# Patient Record
Sex: MALE | Race: WHITE | NOT HISPANIC OR LATINO | Employment: FULL TIME | URBAN - METROPOLITAN AREA
[De-identification: names, ages, dates, MRNs, and addresses within clinical notes are randomized per-mention and may not be internally consistent; named-entity substitution may affect disease eponyms.]

---

## 2020-02-09 ENCOUNTER — OFFICE VISIT (OUTPATIENT)
Dept: URGENT CARE | Facility: URGENT CARE | Age: 55
End: 2020-02-09
Payer: COMMERCIAL

## 2020-02-09 VITALS
SYSTOLIC BLOOD PRESSURE: 157 MMHG | OXYGEN SATURATION: 98 % | HEART RATE: 88 BPM | TEMPERATURE: 97.7 F | DIASTOLIC BLOOD PRESSURE: 100 MMHG | WEIGHT: 230 LBS | BODY MASS INDEX: 30.48 KG/M2 | HEIGHT: 73 IN

## 2020-02-09 DIAGNOSIS — T23.142A SUPERFICIAL BURN OF MULTIPLE DIGITS OF LEFT HAND INCLUDING SUPERFICIAL BURN OF THUMB, INITIAL ENCOUNTER: Primary | ICD-10-CM

## 2020-02-09 DIAGNOSIS — T30.0 BURN, FIRST DEGREE: ICD-10-CM

## 2020-02-09 PROCEDURE — 99203 OFFICE O/P NEW LOW 30 MIN: CPT | Performed by: NURSE PRACTITIONER

## 2020-02-09 RX ORDER — SILVER SULFADIAZINE 10 MG/G
CREAM TOPICAL 2 TIMES DAILY
Qty: 50 G | Refills: 0 | Status: SHIPPED | OUTPATIENT
Start: 2020-02-09 | End: 2020-06-25

## 2020-02-09 RX ORDER — ASPIRIN 81 MG/1
81 TABLET ORAL DAILY
COMMUNITY
End: 2023-02-08

## 2020-02-09 ASSESSMENT — ENCOUNTER SYMPTOMS
WEAKNESS: 0
CHILLS: 0
FEVER: 0
MYALGIAS: 0
NUMBNESS: 0
WOUND: 1

## 2020-02-09 ASSESSMENT — MIFFLIN-ST. JEOR: SCORE: 1932.15

## 2020-02-10 NOTE — PATIENT INSTRUCTIONS
Patient Education     First-Degree Burn  A burn occurs when skin is exposed to too much heat, sun, or harsh chemicals. A first-degree burn (superficial burn) causes mainly redness. It heals in a few days.  Home care  Follow these guidelines when caring for yourself at home:    Use pain medicine as directed. You may use over-the-counter medicine to control pain if no pain medicine was prescribed. If you have chronic liver or kidney disease, talk with your healthcare provider before using acetaminophen or ibuprofen. Also talk with your provider if you've had a stomach ulcer or GI bleeding.    On the first day, you may put a cool compress on the burn to relieve pain. You can use a small towel soaked in cool water as a cool compress.    Numbing medicines for sunburn can be used for temporary relief of the burning feeling. These medicines include lidocaine or benzocaine. You don t need a prescription for them.    Moisturizers with aloe vera can help soothe the burn.    Don't pick or scratch at the affected areas. Use over-the-counter medicines like diphenhydramine for itching. This medicine is taken by mouth.    Since you don't have an open wound, you don't need to use antibiotic cream or ointment. Sometimes an infection may occur even with proper treatment. Be sure to check the burn daily for the signs of infection listed below.    Wear a hat, sunscreen, and long sleeves while in the sun.    Wear loose-fitting clothing until the burn heals.  Follow-up care  Follow up with your healthcare provider, or as advised. Most first-degree burns heal well without complications.  When to seek medical advice  Call your healthcare provider right away if any of these signs of infection occur:    Fever of 100.4 F (38 C) or higher, or as directed by your healthcare provider    Pain gets worse    Redness or swelling gets worse    Pus comes from the burn    Red streaks in your skin come from the burn    Wound doesn't appear to be  healing  Date Last Reviewed: 12/1/2016 2000-2019 [employer]. [address] [place] [address], [place] [address]. All rights reserved. This information is not intended as a substitute for professional medical care. Always follow your healthcare professional's instructions.

## 2020-02-10 NOTE — PROGRESS NOTES
"SUBJECTIVE:   Dontrell Billings is a 54 year old male presenting with a chief complaint of   Chief Complaint   Patient presents with     Urgent Care     Pt in clinic to have eval for left hand index finger burn.     Burn       He is a new patient of [facility].    MS Injury/Burn    Onset of symptoms/accident occurred about 1 hour ago at home while throwing out oil  Location: LEFT hand/thumb  Context:       Patient experienced immediate pain, immediate swelling, set hand in running water  Course of symptoms is same.    Severity 8/10  Current and Associated symptoms: Pain and Redness  Denies any numbness or tingling   Aggravating Factors: movement  Therapies to improve symptoms include: ice  This is not the first time this type of problem has occurred for this patient.       Review of Systems   Constitutional: Negative for chills and fever.   Musculoskeletal: Negative for myalgias.   Skin: Positive for wound.   Neurological: Negative for weakness and numbness.   All other systems reviewed and are negative.      History reviewed. No pertinent past medical history.  History reviewed. No pertinent family history.  Current Outpatient Medications   Medication Sig Dispense Refill     aspirin 81 MG EC tablet Take 81 mg by mouth daily       silver sulfADIAZINE (SILVADENE) 1 % external cream Apply topically 2 times daily for 7 days 50 g 0     Social History     Tobacco Use     Smoking status: Never Smoker     Smokeless tobacco: Never Used   Substance Use Topics     Alcohol use: Not on file       OBJECTIVE  BP (!) 157/100   Pulse 88   Temp 97.7  F (36.5  C) (Oral)   Ht 1.854 m (6' 1\")   Wt 104.3 kg (230 lb)   SpO2 98%   BMI 30.34 kg/m      Physical Exam  Constitutional:       Appearance: Normal appearance.   Cardiovascular:      Rate and Rhythm: Normal rate and regular rhythm.      Pulses: Normal pulses.      Heart sounds: Normal heart sounds.   Pulmonary:      Effort: Pulmonary effort is normal.      Breath sounds: Normal " breath sounds. No wheezing or rhonchi.   Musculoskeletal: Normal range of motion.         General: Tenderness and signs of injury present. No swelling.      Right lower leg: No edema.      Left lower leg: No edema.      Comments: LEFT THUMB and INDEX finger with erythema, and blanching. Index finger noted with small superficial area of peeling skin. Strength 5/5   Skin:     General: Skin is warm.      Capillary Refill: Capillary refill takes less than 2 seconds.      Coloration: Skin is pale.      Findings: Erythema present.   Neurological:      General: No focal deficit present.      Mental Status: He is alert and oriented to person, place, and time. Mental status is at baseline.   Psychiatric:         Mood and Affect: Mood normal.         Behavior: Behavior normal.         Labs:  No results found for this or any previous visit (from the past 24 hour(s)).    ASSESSMENT:    ICD-10-CM    1. Superficial burn of multiple digits of left hand including superficial burn of thumb, initial encounter T23.142A silver sulfADIAZINE (SILVADENE) 1 % external cream   2. Burn, first degree T30.0         Medical Decision Making:    Differential Diagnosis:  MS Injury Pain: burn    Serious Comorbid Conditions:  Adult:  None    PLAN: Discussed with patient and parent overall cares involving a superficial burn. Importance of monitoring wound for signs of infection reviewed. Application of silvadene and wound wrapped with telfa dressings. Next steps with Silvadene and wound care discussed. Education provided. Patient agreed to the plan of care with no further questions or concerns.      RAMIRO Churchill, CNP    Patient Instructions     Patient Education     First-Degree Burn  A burn occurs when skin is exposed to too much heat, sun, or harsh chemicals. A first-degree burn (superficial burn) causes mainly redness. It heals in a few days.  Home care  Follow these guidelines when caring for yourself at home:    Use pain medicine as  directed. You may use over-the-counter medicine to control pain if no pain medicine was prescribed. If you have chronic liver or kidney disease, talk with your healthcare provider before using acetaminophen or ibuprofen. Also talk with your provider if you've had a stomach ulcer or GI bleeding.    On the first day, you may put a cool compress on the burn to relieve pain. You can use a small towel soaked in cool water as a cool compress.    Numbing medicines for sunburn can be used for temporary relief of the burning feeling. These medicines include lidocaine or benzocaine. You don t need a prescription for them.    Moisturizers with aloe vera can help soothe the burn.    Don't pick or scratch at the affected areas. Use over-the-counter medicines like diphenhydramine for itching. This medicine is taken by mouth.    Since you don't have an open wound, you don't need to use antibiotic cream or ointment. Sometimes an infection may occur even with proper treatment. Be sure to check the burn daily for the signs of infection listed below.    Wear a hat, sunscreen, and long sleeves while in the sun.    Wear loose-fitting clothing until the burn heals.  Follow-up care  Follow up with your healthcare provider, or as advised. Most first-degree burns heal well without complications.  When to seek medical advice  Call your healthcare provider right away if any of these signs of infection occur:    Fever of 100.4 F (38 C) or higher, or as directed by your healthcare provider    Pain gets worse    Redness or swelling gets worse    Pus comes from the burn    Red streaks in your skin come from the burn    Wound doesn't appear to be healing  Date Last Reviewed: 12/1/2016 2000-2019 [employer]. [address] [place] [id number]. All rights reserved. This information is not intended as a substitute for professional medical care. Always follow your healthcare professional's instructions.

## 2020-05-20 ENCOUNTER — OFFICE VISIT (OUTPATIENT)
Dept: URGENT CARE | Facility: URGENT CARE | Age: 55
End: 2020-05-20
Payer: COMMERCIAL

## 2020-05-20 VITALS
OXYGEN SATURATION: 99 % | DIASTOLIC BLOOD PRESSURE: 96 MMHG | BODY MASS INDEX: 30.34 KG/M2 | WEIGHT: 230 LBS | SYSTOLIC BLOOD PRESSURE: 157 MMHG | HEART RATE: 76 BPM | TEMPERATURE: 98.8 F

## 2020-05-20 DIAGNOSIS — L03.211 FACIAL CELLULITIS: Primary | ICD-10-CM

## 2020-05-20 PROCEDURE — 99213 OFFICE O/P EST LOW 20 MIN: CPT | Performed by: PHYSICIAN ASSISTANT

## 2020-05-20 RX ORDER — SULFAMETHOXAZOLE/TRIMETHOPRIM 800-160 MG
1 TABLET ORAL 2 TIMES DAILY
Qty: 14 TABLET | Refills: 0 | Status: SHIPPED | OUTPATIENT
Start: 2020-05-20 | End: 2020-06-25

## 2020-05-20 NOTE — PROGRESS NOTES
CHIEF COMPLAINT:   Chief Complaint   Patient presents with     Urgent Care     Sore     c/o sore on nose for 1 day       HPI: Dontrell Billings is a 54 year old male who presents to clinic today for evaluation of nose lesion. Patient woke up this AM and had a painful swollen area on his nose. He placed silver cream on his nose, but has noticed that the redness has migrated upwards. He has some tenderness on the lateral aspect of his nose. He has not had fever or chills. No drainage from the area. Does not have history of MRSA.     No past medical history on file.  No past surgical history on file.  Social History     Tobacco Use     Smoking status: Never Smoker     Smokeless tobacco: Never Used   Substance Use Topics     Alcohol use: Not on file     Current Outpatient Medications   Medication     aspirin 81 MG EC tablet     sulfamethoxazole-trimethoprim (BACTRIM DS) 800-160 MG tablet     No current facility-administered medications for this visit.      No Known Allergies    10 point ROS of systems including Constitutional, Eyes, Respiratory, Cardiovascular, Gastroenterology, Genitourinary, Integumentary, Muscularskeletal, Psychiatric were all negative except for pertinent positives noted in my HPI.        Exam:  BP (!) 157/96   Pulse 76   Temp 98.8  F (37.1  C) (Oral)   Wt 104.3 kg (230 lb)   SpO2 99%   BMI 30.34 kg/m    Constitutional: healthy, alert and no distress  Head: Normocephalic, atraumatic.  Eyes: conjunctiva clear, no drainage  ENT: MMM. L Lateral aspect of nose has redness and swelling with induration. Mild amount of redness on lateral aspect of nose. NO sinus tenderness.   Neck: neck is supple, no cervical lymphadenopathy or nuchal rigidity  Cardiovascular: RRR  Respiratory: CTA bilaterally, no rhonchi or rales  Skin: no rashes  Neurologic: Speech clear, gait normal. Moves all extremities.    ASSESSMENT/PLAN:  1. Facial cellulitis  DDX: Herpes zoster, erysipelas, allergic reaction / hives,  candida  Induration, erythema and some TTP on left lateral aspect of nose without systemic symptoms. Rash and my physical exam consistent with cellulitis. Area is not improving with topical management. RX was sent to his pharmacy to begin taking tonight.   If worsening symptoms, follow-up in clinic or ER  - sulfamethoxazole-trimethoprim (BACTRIM DS) 800-160 MG tablet; Take 1 tablet by mouth 2 times daily for 7 days  Dispense: 14 tablet; Refill: 0    Diagnosis and treatment plan was reviewed with patient and/or family.   We went over any labs or imaging. Discussed worsening symptoms or little to no relief despite treatment plan to follow-up in clinic or go to ER.  Patient verbalizes understanding. All questions were addressed and answered.   Viri Villanueva PA-C

## 2020-05-20 NOTE — PATIENT INSTRUCTIONS
Patient Education     Facial Cellulitis  Cellulitis is an infection of the deep layers of skin. A break in the skin, such as a cut or scratch, can let bacteria under the skin. It may also occur from an infected oil gland (pimple) or hair follicle. If the bacteria get to deep layers of the skin, it can be serious. If not treated, cellulitis can get into the bloodstream and lymph nodes. The infection can then spread throughout the body. This causes serious illness.  Cellulitis causes the affected skin to become red, swollen, warm, and sore. The reddened areas have a visible border. You may have a fever, chills, and pain.  Cellulitis is treated with antibiotics taken for 7 to 10 days. Symptoms should get better 1 to 2 days after treatment is started. Make sure to take all the antibiotics for the full number of days until they are gone. Keep taking the medication even if your symptoms go away.  Home care  Follow these tips:    Take all of the antibiotic medicine exactly as directed until it is gone. Don t miss any doses, especially during the first 7 days. Don t stop taking it when your symptoms get better.    Use a cool compress (face cloth soaked in cool water) on your face to help reduce swelling and pain.    You may use acetaminophen or ibuprofen to reduce pain. Don t use these if you have chronic liver or kidney disease, or ever had a stomach ulcer or gastrointestinal bleeding. Talk with your healthcare provider first.  Follow-up care  Follow up with your healthcare provider, or as advised. If your infection does not go away on the first antibiotic, your healthcare provider will prescribe a different one.  When to seek medical advice  Call your healthcare provider right away if any of these occur:    Fever higher of 100.4  F (38.0  C) or higher after 2 days on antibiotics    Red areas that spread    Swelling or pain that gets worse    Fluid leaking from the skin (pus)    An eyelid that swells shut or leaks fluid  (pus)    Headache or neck pain that gets worse    Unusual drowsiness or confusion    Convulsions (seizure)    Change in eyesight     Date Last Reviewed: 9/1/2016 2000-2019 [employer]. [address] [place] [address], [place] [address]. All rights reserved. This information is not intended as a substitute for professional medical care. Always follow your healthcare professional's instructions.

## 2020-06-23 ENCOUNTER — NURSE TRIAGE (OUTPATIENT)
Dept: NURSING | Facility: CLINIC | Age: 55
End: 2020-06-23

## 2020-06-23 NOTE — TELEPHONE ENCOUNTER
He has had a little lump under his skin on his stomach for 2 weeks and not going away. It itched at first, but no itching or pain now. There is a little red area over the lump. It is not getting bigger or worse. Denies fever. [facility]  will call him back now. My transfer on the phone is not working.     Joyce Galindo RN  [facility]  [facility]

## 2020-06-25 ENCOUNTER — OFFICE VISIT (OUTPATIENT)
Dept: FAMILY MEDICINE | Facility: CLINIC | Age: 55
End: 2020-06-25
Payer: COMMERCIAL

## 2020-06-25 VITALS
SYSTOLIC BLOOD PRESSURE: 137 MMHG | HEART RATE: 96 BPM | TEMPERATURE: 98.5 F | RESPIRATION RATE: 18 BRPM | BODY MASS INDEX: 29.95 KG/M2 | OXYGEN SATURATION: 99 % | WEIGHT: 227 LBS | DIASTOLIC BLOOD PRESSURE: 85 MMHG

## 2020-06-25 DIAGNOSIS — R23.8 OTHER SKIN CHANGES: ICD-10-CM

## 2020-06-25 DIAGNOSIS — L72.3 SEBACEOUS CYST: Primary | ICD-10-CM

## 2020-06-25 LAB
KOH PREP SPEC: NORMAL
SPECIMEN SOURCE: NORMAL

## 2020-06-25 PROCEDURE — 99213 OFFICE O/P EST LOW 20 MIN: CPT | Performed by: PHYSICIAN ASSISTANT

## 2020-06-25 PROCEDURE — 87220 TISSUE EXAM FOR FUNGI: CPT | Performed by: PHYSICIAN ASSISTANT

## 2020-06-25 NOTE — PROGRESS NOTES
Subjective     Dontrell Billings is a 54 year old male who presents to clinic today for the following health issues:    HPI     Lump Concern   A small bump right above navel, it is almost gone     Duration: 3 weeks ago today     Description (location/character/radiation):        No abdominal pain     Intensity:  mild    Accompanying signs and symptoms:        Fever/Chills: no        Gas/Bloating: no        Nausea/vomitting: no        Diarrhea: no        Dysuria or Hematuria: no     History (previous similar pain/trauma/previous testing): Crohn's     Therapies tried and outcome: None    Was taking a shower three weeks ago noticed small lump above belly button. Worried it was a buried tick. Has been going down since that time. Never painful. There was a faint redness at beginning. No known tick bites to that area. He thinks he found it because it was itchy one day. No joint pain or flu like symptoms. No fevers, chills, night sweats, or unexplained weight loss.       Toe Problem     Duration: about a week ago     Description (location/character/radiation): brown spot on L & R big toe    Intensity:  No pain    Therapies tried and outcome: antifungal Lotrimin cream       Review of Systems   Constitutional, HEENT, cardiovascular, pulmonary, gi and gu systems are negative, except as otherwise noted.      Objective    /85   Pulse 96   Temp 98.5  F (36.9  C) (Oral)   Resp 18   Wt 103 kg (227 lb)   SpO2 99%   BMI 29.95 kg/m    Body mass index is 29.95 kg/m .  Physical Exam  Vitals signs and nursing note reviewed.   Constitutional:       Appearance: Normal appearance.   HENT:      Head: Normocephalic and atraumatic.   Pulmonary:      Effort: Pulmonary effort is normal.   Abdominal:       Musculoskeletal:        Feet:    Neurological:      Mental Status: He is alert and oriented to person, place, and time.        Diagnostic Test Results:  Labs reviewed in Epic  Results for orders placed or performed in visit on  06/25/20 (from the past 24 hour(s))   KOH prep (skin, hair or nails only)    Specimen: toe   Result Value Ref Range    Specimen Description Toe     KOH Skin Hair Nails Test No fungal elements seen            Assessment & Plan     (L72.3) Sebaceous cyst  (primary encounter diagnosis)  Comment: DDx is sebaceous cyst vs lipoma. Very small non tender lump on exam. Has been shrinking in size per patient. Continue to monitor. If worsening can follow up with dermatology.  Plan: DERMATOLOGY REFERRAL          (R23.8) Other skin changes  Comment: Consistent with calluses to bilateral toes 1 and 2. Advised to wear well fitting shoes. KOH negative.  Plan: KOH prep (skin, hair or nails only)           Return for if not improving or worsening.    Palomo Butler PA-C  [facility]

## 2020-06-26 ENCOUNTER — TELEPHONE (OUTPATIENT)
Dept: FAMILY MEDICINE | Facility: CLINIC | Age: 55
End: 2020-06-26

## 2020-06-26 NOTE — TELEPHONE ENCOUNTER
----- Message from Palomo Butler PA-C sent at 6/25/2020  4:22 PM CDT -----  Please call patient and inform no fungus was seen on his skin scraping.   Recommend he try to wear better fitting shoes.  Thanks,  Palomo Butler

## 2020-06-26 NOTE — TELEPHONE ENCOUNTER
Left message on machine to call back.  Ask to speak to any triage nurse, let them know it's a return call.    Leave a number and time that you can be reached.   Josie Perez RN

## 2021-03-22 ENCOUNTER — OFFICE VISIT (OUTPATIENT)
Dept: FAMILY MEDICINE | Facility: CLINIC | Age: 56
End: 2021-03-22
Payer: COMMERCIAL

## 2021-03-22 VITALS
HEART RATE: 88 BPM | HEIGHT: 73 IN | WEIGHT: 240 LBS | SYSTOLIC BLOOD PRESSURE: 146 MMHG | RESPIRATION RATE: 18 BRPM | BODY MASS INDEX: 31.81 KG/M2 | OXYGEN SATURATION: 99 % | DIASTOLIC BLOOD PRESSURE: 87 MMHG | TEMPERATURE: 97.9 F

## 2021-03-22 DIAGNOSIS — L98.9 SKIN LESION: Primary | ICD-10-CM

## 2021-03-22 PROCEDURE — 99202 OFFICE O/P NEW SF 15 MIN: CPT | Performed by: FAMILY MEDICINE

## 2021-03-22 ASSESSMENT — MIFFLIN-ST. JEOR: SCORE: 1976.47

## 2021-03-22 NOTE — PROGRESS NOTES
"    Assessment & Plan     Skin lesion  Left lower lip. Unclear what lesion was initially. By history is slowly healing. Likely exacerbated by patient initially causing slower healing. Discussed allowing a few more weeks to heal. If lesion does not appear to heal in a few weeks discussed skin check by derm.  - DERMATOLOGY ADULT REFERRAL      16 minutes spent on the date of the encounter doing chart review, history and exam, documentation and further activities as noted above    DO PRIMO Mijares [facility]    Vikas Jon is a 55 year old who presents for the following health issues:    HPI   No PCP. Here to discuss skin lesion on lower lip. Notice a small spot on the left side of his lower lip about 6 weeks ago. Started off as a small tag on his lip, scraped it with tooth and noticed a bump the next day. Notes that other cold sores appeared around the same time but have since resolved. Lesion on lip has never been painful, no discharge or bleeding. [facility] it. States lesion has slowly improved but not completely resolved.   Hx of skin cancer on back, melanoma per patient.     Dad with hx of skin cancer.     Concern -  Onset: x 3 months   Description:   Intensity: mild  Progression of Symptoms:  same  Accompanying Signs & Symptoms: none   Previous history of similar problem:   Therapies tried and outcome: None    Review of Systems   CONSTITUTIONAL: NEGATIVE for fever, chills, change in weight  EYES: NEGATIVE for vision changes or irritation  RESP: NEGATIVE for significant cough or SOB  CV: NEGATIVE for chest pain, palpitations or peripheral edema  NEURO: NEGATIVE for weakness, dizziness or paresthesias      Objective    BP (!) 146/87 (BP Location: Left arm, Patient Position: Sitting, Cuff Size: Adult Large)   Pulse 88   Temp 97.9  F (36.6  C) (Tympanic)   Resp 18   Ht 1.861 m (6' 1.25\")   Wt 108.9 kg (240 lb)   SpO2 99%   BMI 31.45 kg/m  "   Body mass index is 31.45 kg/m .  Physical Exam   GENERAL: healthy, alert and no distress  NECK: no adenopathy, no asymmetry, masses, or scars and thyroid normal to palpation  RESP: breathing comfortably, no acute distress  SKIN: pinpoint slightly raised skin colored lesion over left lower lip without ulceration, erythema, discharge.

## 2021-05-11 ENCOUNTER — OFFICE VISIT (OUTPATIENT)
Dept: DERMATOLOGY | Facility: CLINIC | Age: 56
End: 2021-05-11
Payer: COMMERCIAL

## 2021-05-11 DIAGNOSIS — D18.01 CHERRY ANGIOMA: ICD-10-CM

## 2021-05-11 DIAGNOSIS — L91.8 ACHROCHORDON: ICD-10-CM

## 2021-05-11 DIAGNOSIS — D23.9 DERMAL NEVUS: ICD-10-CM

## 2021-05-11 DIAGNOSIS — Z85.828 HISTORY OF SKIN CANCER: Primary | ICD-10-CM

## 2021-05-11 PROCEDURE — 99203 OFFICE O/P NEW LOW 30 MIN: CPT | Mod: GC | Performed by: DERMATOLOGY

## 2021-05-11 ASSESSMENT — PAIN SCALES - GENERAL: PAINLEVEL: NO PAIN (0)

## 2021-05-11 NOTE — PATIENT INSTRUCTIONS
- use SPF 30 or above.  - return to dermatology clinic in 1 year.    Patient Education     Checking for Skin Cancer  You can find cancer early by checking your skin each month. There are 3 kinds of skin cancer. They are melanoma, basal cell carcinoma, and squamous cell carcinoma. Doing monthly skin checks is the best way to find new marks or skin changes. Follow the instructions below for checking your skin.   The ABCDEs of checking moles for melanoma   Check your moles or growths for signs of melanoma using ABCDE:     Asymmetry: the sides of the mole or growth don t match    Border: the edges are ragged, notched, or blurred    Color: the color within the mole or growth varies    Diameter: the mole or growth is larger than 6 mm (size of a pencil eraser)    Evolving: the size, shape, or color of the mole or growth is changing (evolving is not shown in the images below)    Checking for other types of skin cancer  Basal cell carcinoma or squamous cell carcinoma have symptoms such as:       A spot or mole that looks different from all other marks on your skin    Changes in how an area feels, such as itching, tenderness, or pain    Changes in the skin's surface, such as oozing, bleeding, or scaliness    A sore that does not heal    New swelling or redness beyond the border of a mole    Who s at risk?  Anyone can get skin cancer. But you are at greater risk if you have:     Fair skin, light-colored hair, or light-colored eyes    Many moles or abnormal moles on your skin    A history of sunburns from sunlight or tanning beds    A family history of skin cancer    A history of exposure to radiation or chemicals    A weakened immune system  If you have had skin cancer in the past, you are at risk for recurring skin cancer.   How to check your skin  Do your monthly skin checkups in front of a full-length mirror. Check all parts of your body, including your:     Head (ears, face, neck, and scalp)    Torso (front, back, and  sides)    Arms (tops, undersides, upper, and lower armpits)    Hands (palms, backs, and fingers, including under the nails)    Buttocks and genitals    Legs (front, back, and sides)    Feet (tops, soles, toes, including under the nails, and between toes)  If you have a lot of moles, take digital photos of them each month. Make sure to take photos both up close and from a distance. These can help you see if any moles change over time.   Most skin changes are not cancer. But if you see any changes in your skin, call your doctor right away. Only he or she can diagnose a problem. If you have skin cancer, seeing your doctor can be the first step toward getting the treatment that could save your life.   [employer] last reviewed this educational content on 4/1/2019 2000-2020 [employer]. [address] [place] [address]. All rights reserved. This information is not intended as a substitute for professional medical care. Always follow your healthcare professional's instructions.       When should I call my doctor?    If you are worsening or not improving, please, contact us or seek urgent care as noted below.     Who should I call with questions (adults)?    [facility] (adult and pediatric): [phone]     [facility] (adult): [phone]    For urgent needs outside of business hours call the [facility] at [phone] and ask for the dermatology resident on call    If this is a medical emergency and you are unable to reach an ER, Call [phone]      Who should I call with questions (pediatric)?  [facility]- Pediatric Dermatology  Dr. Abby Torres, Dr. Swathi Melara, Dr. Mansi Johnson, Aiyana Beverly, PA  Dr. Lesley Scott, Dr. Halie Craven & Dr. Thanh Maguire  Non Urgent  Nurse Triage Line; [phone]- Pina and Jeane BROCK Care Coordinatorismael Patel (/Complex )  [phone]    If you need a prescription refill, please contact your pharmacy. Refills are approved or denied by our Physicians during normal business hours, Monday through Fridays  Per office policy, refills will not be granted if you have not been seen within the past year (or sooner depending on your child's condition)    Scheduling Information:  Pediatric Appointment Scheduling and Call Center [phone]  Radiology Scheduling- [phone]  Sedation Unit Scheduling- [phone]  [place] Scheduling- General [phone]; Pediatric Dermatology [phone]  Main  Services: [phone]  Singaporean: [phone]  Cameroonian: [phone]  Hmong/Ethiopian/Josue: [phone]  Preadmission Nursing Department Fax Number: [phone] (Fax all pre-operative paperwork to this number)    For urgent matters arising during evenings, weekends, or holidays that cannot wait for normal business hours please call [phone] and ask for the Dermatology Resident On-Call to be paged.

## 2021-05-11 NOTE — NURSING NOTE
Chief Complaint   Patient presents with     Derm Problem     Dontrell is here for a full body skin exam. He states he has not areas of concern.    Martinez Salinas, CMA

## 2021-05-11 NOTE — LETTER
5/11/2021       RE: Dontrell Billings  [address]  [place] [address]     Dear Colleague,    Thank you for referring your patient, Dontrell Billings, to the [facility] [place] at [facility]. Please see a copy of my visit note below.    [facility] Dermatology Note  Encounter Date: May 11, 2021  Office Visit     Dermatology Problem List:  1. Hx unknown skin cancer R lateral back ?2011- patient will request records  ____________________________________________    Assessment & Plan:     # Hx of unknown skin cancer  Was treated in [place] 10 years ago. Patient is uncertain what was removed. Scar does not appear large enough to be a true melanoma. However cannot rule it out. Patient denies a lymph node being removed. NERD today.   - patient asked to obtain records if possible (practice no longer exists.)   - sun protection advised.     # R hand hypopigmented macule  7 x 4cm. Present his entire life. Not changing. On exam, has the appearance of a cafe au lait macule.   - patient will continue to monitor  - reassurance    # Acrochordons x2 inner R/L thigh  Patient is not bothered by these and does want removal.   - reassurance    # Dermal nevi, trunk  Benign appearance on exam.  - reassurance.     Procedures Performed:   none    Follow-up: 1 year(s) in-person, or earlier for new or changing lesions    Staff and Resident:     Dr David Gupta MD PGY-2  Medicine-Dermatology    I have seen and examined this patient and agree with the assessment and plan as documented in the resident's note.    Ad Hernandez MD  Dermatology Attending    ____________________________________________    CC: Derm Problem (Dontrell is here for a full body skin exam. He states he has not areas of concern. )    HPI:  Mr. Dontrell Billings is a(n) 55 year old male who presents today as a new patient for skin check.   In the past he has been to [place]  [facility] skin specialists \"years ago\".   - R lateral back, had excision for an unknown skin cancer in ?2011.  - Benign biopsy per patient of a \"red spot\" on his R scalp in ?2016.  - Family history of ?skin cancer ?father.     He made the appointment because he had a spot with hanging skin that was not healing. Started end of Jan. He was told to see a derm if it did not resolve so he made the appointment. Today the spot is almost completely resolved. Never bled. Not painful today.     Patient is otherwise feeling well, without additional skin concerns.    Labs Reviewed:  N/A    Physical Exam:  Vitals: There were no vitals taken for this visit.  SKIN: Full skin, which includes the head/face, both arms, chest, back, abdomen,both legs, genitalia and/or groin buttocks, digits and/or nails, was examined.  - There is a dome shaped bright red papule on the frontal scalp.   There is a skin colored fleshy papule(s) located on the back.  Multiple regular brown pigmented macules and papules are identified on the trunk, lower extremities.   Well healed hypopigmented linear scar. There is no erythema, telangectasias, nodularity, or pigmentation on the R lateral back scar from prior excision.  There are tan to brown waxy stuck on papules on the trunk.   - No other lesions of concern on areas examined.     Medications:  Current Outpatient Medications   Medication     aspirin 81 MG EC tablet     OMEPRAZOLE PO     No current facility-administered medications for this visit.       Past Medical History:   There is no problem list on file for this patient.    No past medical history on file.    CC Referred Self, MD  No address on file on close of this encounter.      "

## 2021-05-11 NOTE — PROGRESS NOTES
"[facility] Dermatology Note  Encounter Date: May 11, 2021  Office Visit     Dermatology Problem List:  1. Hx unknown skin cancer R lateral back ?2011- patient will request records  ____________________________________________    Assessment & Plan:     # Hx of unknown skin cancer  Was treated in [place] 10 years ago. Patient is uncertain what was removed. Scar does not appear large enough to be a true melanoma. However cannot rule it out. Patient denies a lymph node being removed. NERD today.   - patient asked to obtain records if possible (practice no longer exists.)   - sun protection advised.     # R hand hypopigmented macule  7 x 4cm. Present his entire life. Not changing. On exam, has the appearance of a cafe au lait macule.   - patient will continue to monitor  - reassurance    # Acrochordons x2 inner R/L thigh  Patient is not bothered by these and does want removal.   - reassurance    # Dermal nevi, trunk  Benign appearance on exam.  - reassurance.     Procedures Performed:   none    Follow-up: 1 year(s) in-person, or earlier for new or changing lesions    Staff and Resident:     Dr David Gupta MD PGY-2  Medicine-Dermatology    I have seen and examined this patient and agree with the assessment and plan as documented in the resident's note.    Ad Hernandez MD  Dermatology Attending    ____________________________________________    CC: Derm Problem (Dontrell is here for a full body skin exam. He states he has not areas of concern. )    HPI:  Mr. Dontrell Billings is a(n) 55 year old male who presents today as a new patient for skin check.   In the past he has been to [facility] skin specialists \"years ago\".   - R lateral back, had excision for an unknown skin cancer in ?2011.  - Benign biopsy per patient of a \"red spot\" on his R scalp in ?2016.  - Family history of ?skin cancer ?father.     He made the appointment because he had a spot with hanging skin that was not healing. " Started end of Jan. He was told to see a derm if it did not resolve so he made the appointment. Today the spot is almost completely resolved. Never bled. Not painful today.     Patient is otherwise feeling well, without additional skin concerns.    Labs Reviewed:  N/A    Physical Exam:  Vitals: There were no vitals taken for this visit.  SKIN: Full skin, which includes the head/face, both arms, chest, back, abdomen,both legs, genitalia and/or groin buttocks, digits and/or nails, was examined.  - There is a dome shaped bright red papule on the frontal scalp.   There is a skin colored fleshy papule(s) located on the back.  Multiple regular brown pigmented macules and papules are identified on the trunk, lower extremities.   Well healed hypopigmented linear scar. There is no erythema, telangectasias, nodularity, or pigmentation on the R lateral back scar from prior excision.  There are tan to brown waxy stuck on papules on the trunk.   - No other lesions of concern on areas examined.     Medications:  Current Outpatient Medications   Medication     aspirin 81 MG EC tablet     OMEPRAZOLE PO     No current facility-administered medications for this visit.       Past Medical History:   There is no problem list on file for this patient.    No past medical history on file.    CC Referred Self, MD  No address on file on close of this encounter.

## 2021-06-06 PROBLEM — D18.01 CHERRY ANGIOMA: Status: ACTIVE | Noted: 2021-06-06

## 2021-06-06 PROBLEM — L91.8 ACHROCHORDON: Status: ACTIVE | Noted: 2021-06-06

## 2021-06-06 PROBLEM — D23.9 DERMAL NEVUS: Status: ACTIVE | Noted: 2021-06-06

## 2021-06-06 PROBLEM — Z85.828 HISTORY OF SKIN CANCER: Status: ACTIVE | Noted: 2021-06-06

## 2021-10-04 ENCOUNTER — OFFICE VISIT (OUTPATIENT)
Dept: URGENT CARE | Facility: URGENT CARE | Age: 56
End: 2021-10-04
Payer: COMMERCIAL

## 2021-10-04 VITALS
OXYGEN SATURATION: 99 % | BODY MASS INDEX: 31.14 KG/M2 | WEIGHT: 235 LBS | HEART RATE: 116 BPM | DIASTOLIC BLOOD PRESSURE: 95 MMHG | TEMPERATURE: 99.7 F | HEIGHT: 73 IN | SYSTOLIC BLOOD PRESSURE: 142 MMHG

## 2021-10-04 DIAGNOSIS — J06.9 UPPER RESPIRATORY TRACT INFECTION, UNSPECIFIED TYPE: Primary | ICD-10-CM

## 2021-10-04 LAB
ANION GAP SERPL CALCULATED.3IONS-SCNC: 7 MMOL/L (ref 3–14)
BASOPHILS # BLD AUTO: 0 10E3/UL (ref 0–0.2)
BASOPHILS NFR BLD AUTO: 1 %
BUN SERPL-MCNC: 9 MG/DL (ref 7–30)
CALCIUM SERPL-MCNC: 9 MG/DL (ref 8.5–10.1)
CHLORIDE BLD-SCNC: 104 MMOL/L (ref 94–109)
CO2 SERPL-SCNC: 23 MMOL/L (ref 20–32)
CREAT SERPL-MCNC: 0.94 MG/DL (ref 0.66–1.25)
EOSINOPHIL # BLD AUTO: 0 10E3/UL (ref 0–0.7)
EOSINOPHIL NFR BLD AUTO: 0 %
ERYTHROCYTE [DISTWIDTH] IN BLOOD BY AUTOMATED COUNT: 13.3 % (ref 10–15)
GFR SERPL CREATININE-BSD FRML MDRD: 90 ML/MIN/1.73M2
GLUCOSE BLD-MCNC: 97 MG/DL (ref 70–99)
HCT VFR BLD AUTO: 48.3 % (ref 40–53)
HGB BLD-MCNC: 16.2 G/DL (ref 13.3–17.7)
IMM GRANULOCYTES # BLD: 0 10E3/UL
IMM GRANULOCYTES NFR BLD: 0 %
LYMPHOCYTES # BLD AUTO: 0.6 10E3/UL (ref 0.8–5.3)
LYMPHOCYTES NFR BLD AUTO: 17 %
MCH RBC QN AUTO: 29.7 PG (ref 26.5–33)
MCHC RBC AUTO-ENTMCNC: 33.5 G/DL (ref 31.5–36.5)
MCV RBC AUTO: 89 FL (ref 78–100)
MONOCYTES # BLD AUTO: 0.4 10E3/UL (ref 0–1.3)
MONOCYTES NFR BLD AUTO: 13 %
NEUTROPHILS # BLD AUTO: 2.3 10E3/UL (ref 1.6–8.3)
NEUTROPHILS NFR BLD AUTO: 70 %
PLATELET # BLD AUTO: 178 10E3/UL (ref 150–450)
POTASSIUM BLD-SCNC: 3.8 MMOL/L (ref 3.4–5.3)
RBC # BLD AUTO: 5.46 10E6/UL (ref 4.4–5.9)
SODIUM SERPL-SCNC: 134 MMOL/L (ref 133–144)
WBC # BLD AUTO: 3.3 10E3/UL (ref 4–11)

## 2021-10-04 PROCEDURE — 99213 OFFICE O/P EST LOW 20 MIN: CPT | Performed by: PHYSICIAN ASSISTANT

## 2021-10-04 PROCEDURE — U0003 INFECTIOUS AGENT DETECTION BY NUCLEIC ACID (DNA OR RNA); SEVERE ACUTE RESPIRATORY SYNDROME CORONAVIRUS 2 (SARS-COV-2) (CORONAVIRUS DISEASE [COVID-19]), AMPLIFIED PROBE TECHNIQUE, MAKING USE OF HIGH THROUGHPUT TECHNOLOGIES AS DESCRIBED BY CMS-2020-01-R: HCPCS | Performed by: PHYSICIAN ASSISTANT

## 2021-10-04 PROCEDURE — 85025 COMPLETE CBC W/AUTO DIFF WBC: CPT | Performed by: PHYSICIAN ASSISTANT

## 2021-10-04 PROCEDURE — 80048 BASIC METABOLIC PNL TOTAL CA: CPT | Performed by: PHYSICIAN ASSISTANT

## 2021-10-04 PROCEDURE — 36415 COLL VENOUS BLD VENIPUNCTURE: CPT | Performed by: PHYSICIAN ASSISTANT

## 2021-10-04 PROCEDURE — U0005 INFEC AGEN DETEC AMPLI PROBE: HCPCS | Performed by: PHYSICIAN ASSISTANT

## 2021-10-04 ASSESSMENT — MIFFLIN-ST. JEOR: SCORE: 1949.83

## 2021-10-04 NOTE — PROGRESS NOTES
Upper respiratory tract infection, unspecified type  - Symptomatic COVID-19 Virus (Coronavirus) by PCR; Future  - CBC with platelets and differential; Future  - Basic metabolic panel  (Ca, Cl, CO2, Creat, Gluc, K, Na, BUN); Future  - Basic metabolic panel  (Ca, Cl, CO2, Creat, Gluc, K, Na, BUN)  - Symptomatic COVID-19 Virus (Coronavirus) by PCR Nose  - CBC with platelets and differential    20 minutes spent on the date of the encounter doing chart review, history and exam, documentation and further activities per the note.     See Patient Instructions  Patient Instructions       Patient Education     Viral Upper Respiratory Illness (Adult)    You have a viral upper respiratory illness (URI), which is another term for the common cold. This illness is contagious during the first few days. It is spread through the air by coughing and sneezing. It may also be spread by direct contact (touching the sick person and then touching your own eyes, nose, or mouth). Frequent handwashing will decrease risk of spread. Most viral illnesses go away within 7 to 10 days with rest and simple home remedies. Sometimes the illness may last for several weeks. Antibiotics will not kill a virus, and they are generally not prescribed for this condition.  Home care    If symptoms are severe, rest at home for the first 2 to 3 days. When you resume activity, don't let yourself get too tired.    Don't smoke. If you need help stopping, talk with your healthcare provider.    Avoid being exposed to cigarette smoke (yours or others ).    You may use acetaminophen or ibuprofen to control pain and fever, unless another medicine was prescribed. If you have chronic liver or kidney disease, have ever had a stomach ulcer or gastrointestinal bleeding, or are taking blood-thinning medicines, talk with your healthcare provider before using these medicines. Aspirin should never be given to anyone under 18 years of age who is ill with a viral infection or  fever. It may cause severe liver or brain damage.    Your appetite may be poor, so a light diet is fine. Stay well hydrated by drinking 6 to 8 glasses of fluids per day (water, soft drinks, juices, tea, or soup). Extra fluids will help loosen secretions in the nose and lungs.    Over-the-counter cold medicines will not shorten the length of time you re sick, but they may be helpful for the following symptoms: cough, sore throat, and nasal and sinus congestion. If you take prescription medicines, ask your healthcare provider or pharmacist which over-the-counter medicines are safe to use. (Note: Don't use decongestants if you have high blood pressure.)  Follow-up care  Follow up with your healthcare provider, or as advised.  When to seek medical advice  Call your healthcare provider right away if any of these occur:    Cough with lots of colored sputum (mucus)    Severe headache; face, neck, or ear pain    Difficulty swallowing due to throat pain    Fever of 100.4 F (38 C) or higher, or as directed by your healthcare provider  Call 911  Call 911 if any of these occur:    Chest pain, shortness of breath, wheezing, or difficulty breathing    Coughing up blood    Very severe pain with swallowing, especially if it goes along with a muffled voice   [employer] last reviewed this educational content on 6/1/2018 2000-2021 [employer]. All rights reserved. This information is not intended as a substitute for professional medical care. Always follow your healthcare professional's instructions.               ALEYDA Matta [facility] URGENT CARE    Subjective   55 year old who presents to clinic today for the following health issues:    Urgent Care, Cough, and Fever       HPI     Acute Illness  Acute illness concerns: Fever and cough  Onset/Duration: Thursday morning   Symptoms:  Fever: YES  Chills/Sweats: no  Headache (location?): no  Sinus Pressure: no  Conjunctivitis:  no  Ear Pain:  no  Rhinorrhea: no  Congestion: YES  Sore Throat: no  Cough: YES  Wheeze: no  Decreased Appetite: no  Nausea: no  Vomiting: no  Diarrhea: no  Dysuria/Freq.: no  Dysuria or Hematuria: no  Fatigue/Achiness: YES  Sick/Strep Exposure: None known   Therapies tried and outcome: Tylenol and ibuprofen     Review of Systems   Review of Systems   See HPI     Objective    Temp: 99.7  F (37.6  C) Temp src: Oral BP: (!) 142/95 Pulse: 116     SpO2: 99 %       Physical Exam   Physical Exam  Constitutional:       General: He is not in acute distress.     Appearance: Normal appearance. He is normal weight. He is not ill-appearing, toxic-appearing or diaphoretic.   HENT:      Head: Normocephalic and atraumatic.      Right Ear: Tympanic membrane, ear canal and external ear normal. There is no impacted cerumen.      Left Ear: Tympanic membrane, ear canal and external ear normal. There is no impacted cerumen.      Nose: Rhinorrhea present. No congestion.      Mouth/Throat:      Pharynx: Posterior oropharyngeal erythema present. No oropharyngeal exudate.   Eyes:      General: No scleral icterus.        Right eye: No discharge.         Left eye: No discharge.      Extraocular Movements: Extraocular movements intact.      Conjunctiva/sclera: Conjunctivae normal.      Pupils: Pupils are equal, round, and reactive to light.   Cardiovascular:      Rate and Rhythm: Normal rate and regular rhythm.      Pulses: Normal pulses.      Heart sounds: Normal heart sounds. No murmur heard.   No friction rub. No gallop.    Pulmonary:      Effort: Pulmonary effort is normal. No respiratory distress.      Breath sounds: No stridor. No wheezing, rhonchi or rales.   Chest:      Chest wall: No tenderness.   Musculoskeletal:      Cervical back: Normal range of motion and neck supple. No tenderness.   Lymphadenopathy:      Cervical: No cervical adenopathy.   Neurological:      General: No focal deficit present.      Mental Status: He is alert and oriented to  person, place, and time. Mental status is at baseline.      Gait: Gait normal.   Psychiatric:         Mood and Affect: Mood normal.         Behavior: Behavior normal.         Thought Content: Thought content normal.         Judgment: Judgment normal.          Results for orders placed or performed in visit on 10/04/21 (from the past 24 hour(s))   CBC with platelets and differential    Narrative    The following orders were created for panel order CBC with platelets and differential.  Procedure                               Abnormality         Status                     ---------                               -----------         ------                     CBC with platelets and d...[[id number]]  Abnormal            Final result                 Please view results for these tests on the individual orders.   CBC with platelets and differential   Result Value Ref Range    WBC Count 3.3 (L) 4.0 - 11.0 10e3/uL    RBC Count 5.46 4.40 - 5.90 10e6/uL    Hemoglobin 16.2 13.3 - 17.7 g/dL    Hematocrit 48.3 40.0 - 53.0 %    MCV 89 78 - 100 fL    MCH 29.7 26.5 - 33.0 pg    MCHC 33.5 31.5 - 36.5 g/dL    RDW 13.3 10.0 - 15.0 %    Platelet Count 178 150 - 450 10e3/uL    % Neutrophils 70 %    % Lymphocytes 17 %    % Monocytes 13 %    % Eosinophils 0 %    % Basophils 1 %    % Immature Granulocytes 0 %    Absolute Neutrophils 2.3 1.6 - 8.3 10e3/uL    Absolute Lymphocytes 0.6 (L) 0.8 - 5.3 10e3/uL    Absolute Monocytes 0.4 0.0 - 1.3 10e3/uL    Absolute Eosinophils 0.0 0.0 - 0.7 10e3/uL    Absolute Basophils 0.0 0.0 - 0.2 10e3/uL    Absolute Immature Granulocytes 0.0 <=0.0 10e3/uL

## 2021-10-04 NOTE — PATIENT INSTRUCTIONS

## 2021-10-05 ENCOUNTER — TELEPHONE (OUTPATIENT)
Dept: URGENT CARE | Facility: URGENT CARE | Age: 56
End: 2021-10-05

## 2021-10-05 LAB — SARS-COV-2 RNA RESP QL NAA+PROBE: POSITIVE

## 2021-10-05 NOTE — TELEPHONE ENCOUNTER
Coronavirus (COVID-19) Notification    Reason for call  Notify of POSITIVE  COVID-19 lab result, assess symptoms,  review [facility] recommendations    Lab Result   Lab test for 2019-nCoV rRt-PCR or SARS-COV-2 PCR  Oropharyngeal AND/OR nasopharyngeal swabs were POSITIVE for 2019-nCoV RNA [OR] SARS-COV-2 RNA (COVID-19) RNA     We have been unable to reach Patient by phone at this time to notify of their Positive COVID-19 result.  Left voicemail message requesting a call back to [phone] [facility] for results.        POSITIVE COVID-19 Letter sent.    Nga Tapia LPN

## 2021-10-05 NOTE — TELEPHONE ENCOUNTER
"-Coronavirus (COVID-19) Notification    Caller Name (Patient, parent, daughter/son, grandparent, etc)  Patient     Reason for call  Notify of Positive Coronavirus (COVID-19) lab results, assess symptoms,  review  [facility] recommendations    Lab Result    Lab test:  2019-nCoV rRt-PCR or SARS-CoV-2 PCR    Oropharyngeal AND/OR nasopharyngeal swabs is POSITIVE for 2019-nCoV RNA/SARS-COV-2 PCR (COVID-19 virus)    RN Recommendations/Instructions per [facility] Coronavirus COVID-19 recommendations    Brief introduction script  Introduce self then review script:  \"I am calling on behalf of [facility].  We were notified that your Coronavirus test (COVID-19) for was POSITIVE for the virus.  I have some information to relay to you but first I wanted to mention that the [place] Dept of Health will be contacting you shortly [it's possible MD already called Patient] to talk to you more about how you are feeling and other people you have had contact with who might now also have the virus.  Also,  [facility] is Partnering with the [facility] for Covid-19 research, you may be contacted directly by research staff.\"    Assessment (Inquire about Patient's current symptoms)   Assessment   Current Symptoms at time of phone call: (if no symptoms, document No symptoms] fatigued no appetite    Symptoms onset (if applicable) 9/30/2021     If at time of call, Patients symptoms hare worsened, the Patient should contact 911 or have someone drive them to Emergency Dept promptly:      If Patient calling 911, inform 911 personal that you have tested positive for the Coronavirus (COVID-19).  Place mask on and await 911 to arrive.    If Emergency Dept, If possible, please have another adult drive you to the Emergency Dept but you need to wear mask when in contact with other people.      Monoclonal Antibody Administration    You may be eligible to receive a new treatment with a monoclonal antibody for preventing " "hospitalization in patients at high risk for complications from COVID-19.   This medication is still experimental and available on a limited basis; it is given through an IV and must be given at an infusion center. Please note that not all people who are eligible will receive the medication since it is in limited supply.     Are you interested in being considered for this medication?  No.   Does the patient fit the criteria: No    If patient qualifies based on above criteria:  \"You will be contacted if you are selected to receive this treatment in the next 1-2 business days.   This is time sensitive and if you are not selected in the next 1-2 business days, you will not receive the medication.  If you do not receive a call to schedule, you have not been selected.\"      Review information with Patient    Your result was positive. This means you have COVID-19 (coronavirus).  We have sent you a letter that reviews the information that I'll be reviewing with you now.    How can I protect others?    If you have symptoms: stay home and away from others (self-isolate) until:    You've had no fever--and no medicine that reduces fever--for 1 full day (24 hours). And       Your other symptoms have gotten better. For example, your cough or breathing has improved. And     At least 10 days have passed since your symptoms started. (If you've been told by a doctor that you have a weak immune system, wait 20 days.)     If you don't have symptoms: Stay home and away from others (self-isolate) until at least 10 days have passed since your first positive COVID-19 test. (Date test collected)    During this time:    Stay in your own room, including for meals. Use your own bathroom if you can.    Stay away from others in your home. No hugging, kissing or shaking hands. No visitors.     Don't go to work, school or anywhere else.     Clean  high touch  surfaces often (doorknobs, counters, handles, etc.). Use a household cleaning spray or " wipes. You'll find a full list on the EPA website at www.epa.gov/pesticide-registration/list-n-disinfectants-use-against-sars-cov-2.     Cover your mouth and nose with a mask, tissue or other face covering to avoid spreading germs.    Wash your hands and face often with soap and water.    Make a list of people you have been in close contact with recently, even if either of you wore a face covering.   ; Start your list from 2 days before you became ill or had a positive test.  ; Include anyone that was within 6 feet of you for a cumulative total of 15 minutes or more in 24 hours. (Example: if you sat next to Santi for 5 minutes in the morning and 10 minutes in the afternoon, then you were in close contact for 15 minutes total that day. Santi would be added to your list.)    A public health worker will call or text you. It is important that you answer. They will ask you questions about possible exposures to COVID-19, such as people you have been in direct contact with and places you have visited.    Tell the people on your list that you have COVID-19; they should stay away from others for 14 days starting from the last time they were in contact with you (unless you are told something different from a public health worker).     Caregivers in these groups are at risk for severe illness due to COVID-19:  o People 65 years and older  o People who live in a nursing home or long-term care facility  o People with chronic disease (lung, heart, cancer, diabetes, kidney, liver, immunologic)  o People who have a weakened immune system, including those who:  - Are in cancer treatment  - Take medicine that weakens the immune system, such as corticosteroids  - Had a bone marrow or organ transplant  - Have an immune deficiency  - Have poorly controlled HIV or AIDS  - Are obese (body mass index of 40 or higher)  - Smoke regularly    Caregivers should wear gloves while washing dishes, handling laundry and cleaning bedrooms and  bathrooms.    Wash and dry laundry with special caution. Don't shake dirty laundry, and use the warmest water setting you can.    If you have a weakened immune system, ask your doctor about other actions you should take.    For more tips, go to www.cdc.gov/coronavirus/2019-ncov/downloads/10Things.pdf.    You should not go back to work until you meet the guidelines above for ending your home isolation. You don't need to be retested for COVID-19 before going back to work--studies show that you won't spread the virus if it's been at least 10 days since your symptoms started (or 20 days, if you have a weak immune system).    Employers: This document serves as formal notice of your employee's medical guidelines for going back to work. They must meet the above guidelines before going back to work in person.    How can I take care of myself?    1. Get lots of rest. Drink extra fluids (unless a doctor has told you not to).    2. Take Tylenol (acetaminophen) for fever or pain. If you have liver or kidney problems, ask your family doctor if it's okay to take Tylenol.     Take either:     650 mg (two 325 mg pills) every 4 to 6 hours, or     1,000 mg (two 500 mg pills) every 8 hours as needed.     Note: Don't take more than 3,000 mg in one day. Acetaminophen is found in many medicines (both prescribed and over-the-counter medicines). Read all labels to be sure you don't take too much.    For children, check the Tylenol bottle for the right dose (based on their age or weight).    3. If you have other health problems (like cancer, heart failure, an organ transplant or severe kidney disease): Call your specialty clinic if you don't feel better in the next 2 days.    4. Know when to call 911: Emergency warning signs include:    Trouble breathing or shortness of breath    Pain or pressure in the chest that doesn't go away    Feeling confused like you haven't felt before, or not being able to wake up    Bluish-colored lips or  face    5. Sign up for [employer]. We know it's scary to hear that you have COVID-19. We want to track your symptoms to make sure you're okay over the next 2 weeks. Please look for an email from [employer]--this is a free, online program that we'll use to keep in touch. To sign up, follow the link in the email. Learn more at [email or web address].    Where can I get more information?    [facility]: [email or web address]    Coronavirus Basics: www.[email or web address]./[email or web address]/coronavirus/basics.html    What to Do If You're Sick: www.cdc.gov/coronavirus/2019-ncov/about/steps-when-sick.html    Ending Home Isolation: www.cdc.gov/coronavirus/2019-ncov/hcp/disposition-in-home-patients.html     Caring for Someone with COVID-19: www.cdc.gov/coronavirus/2019-ncov/if-you-are-sick/care-for-someone.html     [facility] clinical trials (COVID-19 research studies): [email or web address]-[email or web address]-[email or web address]     A Positive COVID-19 letter will be sent via [employer] or the mail. (Exception, no letters sent to Presurgerical/Preprocedure Patients)    Charity Sharif LPN

## 2021-10-06 ENCOUNTER — TELEPHONE (OUTPATIENT)
Dept: URGENT CARE | Facility: URGENT CARE | Age: 56
End: 2021-10-06
Payer: COMMERCIAL

## 2021-10-06 DIAGNOSIS — U07.1 INFECTION DUE TO 2019 NOVEL CORONAVIRUS: Primary | ICD-10-CM

## 2021-10-06 NOTE — TELEPHONE ENCOUNTER
Patient is calling said he was seen in urgent care and was test positive for covid.  He needs urgent care provider to order/referral for him to be able to have antibody infusion done

## 2022-01-19 ENCOUNTER — OFFICE VISIT (OUTPATIENT)
Dept: FAMILY MEDICINE | Facility: CLINIC | Age: 57
End: 2022-01-19
Payer: COMMERCIAL

## 2022-01-19 ENCOUNTER — ANCILLARY PROCEDURE (OUTPATIENT)
Dept: GENERAL RADIOLOGY | Facility: CLINIC | Age: 57
End: 2022-01-19
Attending: FAMILY MEDICINE
Payer: COMMERCIAL

## 2022-01-19 VITALS
TEMPERATURE: 97.7 F | WEIGHT: 233 LBS | BODY MASS INDEX: 30.74 KG/M2 | HEART RATE: 92 BPM | SYSTOLIC BLOOD PRESSURE: 148 MMHG | RESPIRATION RATE: 16 BRPM | OXYGEN SATURATION: 99 % | DIASTOLIC BLOOD PRESSURE: 100 MMHG

## 2022-01-19 DIAGNOSIS — G89.29 CHRONIC LEFT SHOULDER PAIN: ICD-10-CM

## 2022-01-19 DIAGNOSIS — G89.29 CHRONIC LEFT SHOULDER PAIN: Primary | ICD-10-CM

## 2022-01-19 DIAGNOSIS — M25.512 CHRONIC LEFT SHOULDER PAIN: ICD-10-CM

## 2022-01-19 DIAGNOSIS — M25.512 CHRONIC LEFT SHOULDER PAIN: Primary | ICD-10-CM

## 2022-01-19 PROCEDURE — 73030 X-RAY EXAM OF SHOULDER: CPT | Mod: LT | Performed by: RADIOLOGY

## 2022-01-19 PROCEDURE — 99213 OFFICE O/P EST LOW 20 MIN: CPT | Performed by: FAMILY MEDICINE

## 2022-01-19 NOTE — PROGRESS NOTES
Assessment & Plan     Chronic left shoulder pain  -Possible chronic tear of rotator cuff. Discussed starting with xray to evaluate and trial of PT.  -If worsening or not improved with PT will have pt see ortho  - XR Shoulder Left G/E 3 Views  - [facility] Referral  - Orthopedic  Referral    DO PRIMO Mijares [facility]    Vikas Jon is a 56 year old who presents for the following health issues:    HPI     Pain in left shoulder in the last 6-8 weeks. Worse with applying pressure and certain movements. Elevator door slammed into shoulder late September, which could have been inciting event. No numbness or tingling. No hx of neck injury.  Ibuprofen and heat seem to help. Pain usually lasts for a few seconds at a time when he moves left arm a certain way. No issues with lifting.     Review of Systems         Objective    BP (!) 148/100 (BP Location: Left arm, Patient Position: Sitting, Cuff Size: Adult Regular)   Pulse 92   Temp 97.7  F (36.5  C) (Temporal)   Resp 16   Wt 105.7 kg (233 lb)   SpO2 99%   BMI 30.74 kg/m    Body mass index is 30.74 kg/m .  Physical Exam   GENERAL: healthy, alert and no distress  MS: no gross musculoskeletal defects noted, no edema. 5/5 strength in both upper extremities. Decreased ROM in left upper extremity with extension, internal rotation, abduction, and adduction.  PSYCH: mentation appears normal, affect normal/bright

## 2022-01-19 NOTE — PROGRESS NOTES
{PROVIDER CHARTING PREFERENCE:550054}    Vikas Jon is a 56 year old who presents for the following health issues {ACCOMPANIED BY STATEMENT (Optional):307080}    HPI     {SUPERLIST (Optional):259263}  {additonal problems for provider to add (Optional):483432}    Review of Systems   {ROS COMP (Optional):762774}      Objective    There were no vitals taken for this visit.  There is no height or weight on file to calculate BMI.  Physical Exam   {Exam List (Optional):931699}    {Diagnostic Test Results (Optional):537987}    {AMBULATORY ATTESTATION (Optional):652581}

## 2022-01-19 NOTE — PATIENT INSTRUCTIONS
Patient Education     Shoulder Sprain   A sprain is a stretching or tearing of the ligaments that hold a joint together. A sprain may take up to 8 weeks to fully heal, depending on how severe it is. Moderate to severe shoulder sprains are treated with a sling or shoulder immobilizer. Minor sprains can be treated without any special support.  Home care  The following guidelines will help you care for your injury at home:    If a sling was given to you, leave it in place for the time advised by your healthcare provider. If you aren t sure how long to wear it, ask for advice. If the sling becomes loose, adjust it so that your forearm is parallel to the ground. Your shoulder should feel well supported.    Put an ice pack on the injured area for 20 minutes every 1 to 2 hours the first day. You can make your own ice pack by putting ice cubes in a plastic bag. A bag of frozen peas or something similar works well too. Wrap the bag in a thin towel. Continue with ice packs 3 to 4 times a day for the next 2 to 3 days. Then use the pack as needed to ease pain and swelling.    You may use acetaminophen or ibuprofen to control pain, unless another pain medicine was prescribed. If you have chronic liver or kidney disease, talk with your healthcare provider before using these medicines. Also talk with your provider if you ve had a stomach ulcer or gastrointestinal bleeding.    Shoulder joints become stiff if left in a sling for too long. You should start range of motion exercises usually about 7 to 10 days after the injury. Talk with your provider to find out what type of exercises to do and how soon to start.  Follow-up care  Follow up with your healthcare provider, or as advised.  Any X-rays you had today don t show any broken bones, breaks, or fractures. Sometimes fractures don t show up on the first X-ray. Bruises and sprains can sometimes hurt as much as a fracture. These injuries can take time to heal completely. If your  symptoms don t improve or they get worse, talk with your provider. You may need a repeat X-ray or other treatments.  When to seek medical advice  Call your healthcare provider right away if any of these occur:    Shoulder pain or swelling in your arm that gets worse    Fingers become cold, blue, numb, or tingly    Large amount of bruising of the shoulder or upper arm    Fever or chills  [employer] last reviewed this educational content on 11/1/2019 2000-2021 [employer]. All rights reserved. This information is not intended as a substitute for professional medical care. Always follow your healthcare professional's instructions.

## 2022-01-27 ENCOUNTER — THERAPY VISIT (OUTPATIENT)
Dept: PHYSICAL THERAPY | Facility: CLINIC | Age: 57
End: 2022-01-27
Attending: FAMILY MEDICINE
Payer: COMMERCIAL

## 2022-01-27 DIAGNOSIS — M25.512 CHRONIC LEFT SHOULDER PAIN: ICD-10-CM

## 2022-01-27 DIAGNOSIS — G89.29 CHRONIC LEFT SHOULDER PAIN: ICD-10-CM

## 2022-01-27 PROCEDURE — 97110 THERAPEUTIC EXERCISES: CPT | Mod: GP | Performed by: PHYSICAL THERAPIST

## 2022-01-27 PROCEDURE — 97161 PT EVAL LOW COMPLEX 20 MIN: CPT | Mod: GP | Performed by: PHYSICAL THERAPIST

## 2022-01-27 NOTE — PROGRESS NOTES
Physical Therapy Initial Evaluation  Subjective:    Therapist Generated HPI Evaluation  Problem details: Pt presents to PT with a chief complaint of L shoulder pain with reported gradual onset ~6 months ago (~07/2021) while working in the garden. Pt reports worsening of L shoulder pain over the past couple months with associated sharp pain and significant stiffness. Primary pain location identified at L lateral shoulder and reported to be worse with pushing/pulling, lifting overhead, and lying on involved side. Pain improved slightly with ibuprofen and heat. Pt does report a chronic history of L sided cervical pain for >20 years and denies any worsening neck pain since the onset of his shoulder pain. .         Type of problem:  Left shoulder.    This is a new condition.  Condition occurred with:  Unknown cause.  Where condition occurred: for unknown reasons.  Patient reports pain:  Lateral.  Pain is described as sharp and is intermittent.  Pain is worse in the P.M..  Since onset symptoms are gradually improving.  Associated symptoms:  Loss of motion/stiffness, loss of strength and painful arc. Symptoms are exacerbated by lifting, using arm overhead and lying on extremity  and relieved by NSAID's and heat.  Special tests included:  X-ray.    Restrictions due to condition include:  Working in normal job without restrictions.  Barriers include:  None as reported by patient.    Patient Health History         Pain is reported as 10/10 on pain scale.  General health as reported by patient is excellent.  Pertinent medical history includes: overweight.   Red flags:  None as reported by patient.  Medical allergies: none.   Surgeries include:  None.    Current medications:  None.    Occupation: customer service.   Primary job tasks include:  Computer work and prolonged sitting.                                    Objective:  Standing Alignment:      Shoulder/UE:  Rounded shoulders                                   Cervical/Thoracic Evaluation  Arom wnl cervical: Painfree WFL Cervical AROM in all directions, mild pain with L rotation.                                  Shoulder Evaluation:  ROM:  AROM:    Flexion:  Left:  90    Right:  WNL    Abduction:  Left: 80   Right:  WNL    Internal Rotation:  Left:  Thumb to gluteal fold    Right:  WNL  External Rotation:  Left:  20    Right:  WNL                PROM:    Flexion:  Left:  100          Abduction:  Left:  100          External Rotation:  Left:  25                        Strength:  : Painfree MMT for all L shoulder motions.  Flexion: Left:5/5   Pain:    Right: 5/5     Pain:     Abduction:  Left: 5/5  Pain:    Right: 5/5     Pain:    Internal Rotation:  Left:5/5     Pain:    Right: 5/5     Pain:  External Rotation:   Left:/5     Pain:+   Right:5/5     Pain:              Special Tests:      Left shoulder negative for the following special tests:  Rotator cuff tear      Mobility Tests:      Glenohumeral posterior left:  Hypomobile    Glenohumeral inferior left:  Hypomobile                                             General     ROS    Assessment/Plan:    Patient is a 56 year old male with left side shoulder complaints. S/s consistent with L shoulder adhesive capsulitis.   Patient has the following significant findings with corresponding treatment plan.                Diagnosis 1:  L shoulder pain  Pain -  manual therapy, splint/taping/bracing/orthotics, self management, education and home program  Decreased ROM/flexibility - manual therapy and therapeutic exercise  Decreased joint mobility - manual therapy and therapeutic exercise  Decreased strength - therapeutic exercise and therapeutic activities  Impaired muscle performance - neuro re-education  Impaired posture - neuro re-education    Therapy Evaluation Codes:     Cumulative Therapy Evaluation is: Low complexity.    Previous and current functional limitations:  (See Goal Flow Sheet for this information)    Short term and  Long term goals: (See Goal Flow Sheet for this information)     Communication ability:  Patient appears to be able to clearly communicate and understand verbal and written communication and follow directions correctly.  Treatment Explanation - The following has been discussed with the patient:   RX ordered/plan of care  Anticipated outcomes  Possible risks and side effects  This patient would benefit from PT intervention to resume normal activities.   Rehab potential is good.    Frequency:  1 X week, once daily  Duration:  for 8 weeks  Discharge Plan:  Achieve all LTG.  Independent in home treatment program.  Reach maximal therapeutic benefit.    Please refer to the daily flowsheet for treatment today, total treatment time and time spent performing 1:1 timed codes.

## 2022-01-28 PROBLEM — M25.512 CHRONIC LEFT SHOULDER PAIN: Status: ACTIVE | Noted: 2022-01-28

## 2022-01-28 PROBLEM — G89.29 CHRONIC LEFT SHOULDER PAIN: Status: ACTIVE | Noted: 2022-01-28

## 2023-02-08 ENCOUNTER — OFFICE VISIT (OUTPATIENT)
Dept: FAMILY MEDICINE | Facility: CLINIC | Age: 58
End: 2023-02-08
Payer: COMMERCIAL

## 2023-02-08 VITALS
TEMPERATURE: 97.9 F | WEIGHT: 245.06 LBS | SYSTOLIC BLOOD PRESSURE: 150 MMHG | BODY MASS INDEX: 32.48 KG/M2 | RESPIRATION RATE: 16 BRPM | DIASTOLIC BLOOD PRESSURE: 100 MMHG | OXYGEN SATURATION: 98 % | HEIGHT: 73 IN | HEART RATE: 108 BPM

## 2023-02-08 DIAGNOSIS — M77.11 LATERAL EPICONDYLITIS OF RIGHT ELBOW: Primary | ICD-10-CM

## 2023-02-08 DIAGNOSIS — I10 ESSENTIAL HYPERTENSION: ICD-10-CM

## 2023-02-08 DIAGNOSIS — Z76.89 ENCOUNTER TO ESTABLISH CARE WITH NEW DOCTOR: ICD-10-CM

## 2023-02-08 DIAGNOSIS — Z13.220 SCREENING FOR HYPERLIPIDEMIA: ICD-10-CM

## 2023-02-08 DIAGNOSIS — L91.8 SKIN TAG: ICD-10-CM

## 2023-02-08 DIAGNOSIS — D17.1 BENIGN LIPOMATOUS NEOPLASM OF SKIN AND SUBCUTANEOUS TISSUE OF TRUNK: ICD-10-CM

## 2023-02-08 DIAGNOSIS — R06.83 SNORING: ICD-10-CM

## 2023-02-08 DIAGNOSIS — Z13.1 ENCOUNTER FOR SCREENING FOR DIABETES MELLITUS: ICD-10-CM

## 2023-02-08 LAB
ALBUMIN SERPL BCG-MCNC: 4.1 G/DL (ref 3.5–5.2)
ALP SERPL-CCNC: 77 U/L (ref 40–129)
ALT SERPL W P-5'-P-CCNC: 19 U/L (ref 10–50)
ANION GAP SERPL CALCULATED.3IONS-SCNC: 13 MMOL/L (ref 7–15)
AST SERPL W P-5'-P-CCNC: 28 U/L (ref 10–50)
BILIRUB SERPL-MCNC: 0.5 MG/DL
BUN SERPL-MCNC: 12.1 MG/DL (ref 6–20)
CALCIUM SERPL-MCNC: 9.7 MG/DL (ref 8.6–10)
CHLORIDE SERPL-SCNC: 103 MMOL/L (ref 98–107)
CHOLEST SERPL-MCNC: 219 MG/DL
CREAT SERPL-MCNC: 1.08 MG/DL (ref 0.67–1.17)
DEPRECATED HCO3 PLAS-SCNC: 23 MMOL/L (ref 22–29)
GFR SERPL CREATININE-BSD FRML MDRD: 80 ML/MIN/1.73M2
GLUCOSE SERPL-MCNC: 99 MG/DL (ref 70–99)
HBA1C MFR BLD: 5.2 % (ref 0–5.6)
HDLC SERPL-MCNC: 48 MG/DL
LDLC SERPL CALC-MCNC: 135 MG/DL
NONHDLC SERPL-MCNC: 171 MG/DL
POTASSIUM SERPL-SCNC: 4.4 MMOL/L (ref 3.4–5.3)
PROT SERPL-MCNC: 7.2 G/DL (ref 6.4–8.3)
SODIUM SERPL-SCNC: 139 MMOL/L (ref 136–145)
TRIGL SERPL-MCNC: 178 MG/DL

## 2023-02-08 PROCEDURE — 36415 COLL VENOUS BLD VENIPUNCTURE: CPT | Performed by: NURSE PRACTITIONER

## 2023-02-08 PROCEDURE — 11200 RMVL SKIN TAGS UP TO&INC 15: CPT | Performed by: NURSE PRACTITIONER

## 2023-02-08 PROCEDURE — 83036 HEMOGLOBIN GLYCOSYLATED A1C: CPT | Performed by: NURSE PRACTITIONER

## 2023-02-08 PROCEDURE — 99215 OFFICE O/P EST HI 40 MIN: CPT | Mod: 25 | Performed by: NURSE PRACTITIONER

## 2023-02-08 PROCEDURE — 80053 COMPREHEN METABOLIC PANEL: CPT | Performed by: NURSE PRACTITIONER

## 2023-02-08 PROCEDURE — 80061 LIPID PANEL: CPT | Performed by: NURSE PRACTITIONER

## 2023-02-08 RX ORDER — AMLODIPINE BESYLATE 5 MG/1
5 TABLET ORAL DAILY
Qty: 30 TABLET | Refills: 0 | Status: SHIPPED | OUTPATIENT
Start: 2023-02-08 | End: 2023-03-01

## 2023-02-08 RX ORDER — GABAPENTIN 100 MG/1
100-200 CAPSULE ORAL 3 TIMES DAILY
Qty: 84 CAPSULE | Refills: 0 | Status: SHIPPED | OUTPATIENT
Start: 2023-02-08 | End: 2023-02-22

## 2023-02-08 ASSESSMENT — PAIN SCALES - GENERAL: PAINLEVEL: MODERATE PAIN (5)

## 2023-02-08 NOTE — PROGRESS NOTES
Assessment & Plan     Lateral epicondylitis of right elbow  Conservative measures; compression sleeve; limit use; gabapentin for pain 2/2 HTN avoid ibuprofen; topical anti-inflammatory; follow up if no improvement in 4 weeks  - gabapentin (NEURONTIN) 100 MG capsule  Dispense: 84 capsule; Refill: 0  - diclofenac (VOLTAREN) 1 % topical gel  Dispense: 100 g; Refill: 0    Essential hypertension  Monitor home blood pressure 2-3 times per week; notify if above goal <130/80; dietary and exercise changes to improve blood pressure; given weight gain, stressors, headaches will start hypertensive management; repeat /100 manual   Plan: DME order for Blood pressure machine  - Home Blood Pressure Monitor Order for DME - ONLY FOR DME  - amLODIPine (NORVASC) 5 MG tablet  Dispense: 30 tablet; Refill: 0  For your blood pressure:  1. Check your blood pressure once a day  2. It can be at different times of day, but you should be sitting restfully for ~10 minutes before you take it.  3. Note your blood pressure on a paper log or on your phone  4. In ~2 weeks, send me a message on [employer] with your results.  Your goal is <140/90, even better is <130/80.  5. Low sodium, high potassium (DASH) diet.  - Comprehensive metabolic panel  - follow up in 1 month    Snoring  - Adult Sleep Eval & Management  Referral    Encounter for screening for diabetes mellitus  Weight gain  - Hemoglobin A1c      Screening for hyperlipidemia  Assess ASCVD risk for statin therapy to reduce CAD/CVA ristk  Plan: fasting/non-fasting test  - Lipid Profile nonfasting      Skin tag  LN applied to area x 4 freeze/thaw cycles; discussed home care  - REMOVAL OF SKIN TAGS, FIRST 15    Benign lipomatous neoplasm of skin and subcutaneous tissue of trunk  LLE nodule consistent with lipoma type consistency; monitor for changes ie pain, tenderness, increase size;  - discussed Gen/surg referral for cosmetic removal if desired     Encounter to establish care with  "new doctor  Reviewed chronic health conditions; medications, labs and pertinent health concerns today       BMI:   Estimated body mass index is 32.33 kg/m  as calculated from the following:    Height as of this encounter: 1.854 m (6' 1\").    Weight as of this encounter: 111.2 kg (245 lb 1 oz).   Weight management plan: Discussed healthy diet and exercise guidelines    FUTURE APPOINTMENTS:       - Follow-up visit in 1 month    No follow-ups on file.    RAMIRO [facility]    Vikas Jon is a 57 year old presenting for the following health issues:  Skin Tags, Elbow Pain, and leg bump (PT states he has had rt side elbow pain for about a month. PT states skin skin tag on RT side of face and has a bump RT side of lower leg. )    - right elbow pain: started in January after shoveling snow soreness increases throughout the day; try heat and compression sleeve w/o; denies n/t; weakness; sharp pain; tried 400 mg Ibuprofen     - skin tag on face: right nasolabial fold w/o changes     - bump on left leg: just noticed with in the last several days without changes; nontender    - htn: elevated readings in clinic today; was followed by employer health services for blood pressure management; not only medication BP were mildly above goal; + headaches     - snoring; daytime downiness     Elbow Pain    History of Present Illness       Reason for visit:  Sore elbow skin tag bump on leg    He eats 0-1 servings of fruits and vegetables daily.He consumes 1 sweetened beverage(s) daily.He exercises with enough effort to increase his heart rate 10 to 19 minutes per day.  He exercises with enough effort to increase his heart rate 3 or less days per week.   He is taking medications regularly.         Review of Systems   Constitutional, HEENT, cardiovascular, pulmonary, gi and gu systems are negative, except as otherwise noted.      Objective    BP (!) 148/107 (BP Location: Left arm, Patient " "Position: Sitting, Cuff Size: Adult Regular)   Pulse 108   Temp 97.9  F (36.6  C) (Temporal)   Resp 16   Ht 1.854 m (6' 1\")   Wt 111.2 kg (245 lb 1 oz)   SpO2 98%   BMI 32.33 kg/m    Body mass index is 32.33 kg/m .     Physical Exam   GENERAL: healthy, alert and no distress  NECK: no adenopathy, no asymmetry, masses, or scars and thyroid normal to palpation  RESP: lungs clear to auscultation - no rales, rhonchi or wheezes  CV: regular rate and rhythm, normal S1 S2, no S3 or S4, no murmur, click or rub, no peripheral edema and peripheral pulses strong  ABDOMEN: soft, nontender, no hepatosplenomegaly, no masses and bowel sounds normal  MS: no gross musculoskeletal defects noted, no edema  SKIN: right nasolabial fold 1-2 mm flesh colored skin tag; right LE 3mm firm mobile nontender nodule                      "

## 2023-02-09 ENCOUNTER — ALLIED HEALTH/NURSE VISIT (OUTPATIENT)
Dept: FAMILY MEDICINE | Facility: CLINIC | Age: 58
End: 2023-02-09
Payer: COMMERCIAL

## 2023-02-09 ENCOUNTER — TELEPHONE (OUTPATIENT)
Dept: FAMILY MEDICINE | Facility: CLINIC | Age: 58
End: 2023-02-09

## 2023-02-09 ENCOUNTER — ALLIED HEALTH/NURSE VISIT (OUTPATIENT)
Dept: FAMILY MEDICINE | Facility: CLINIC | Age: 58
End: 2023-02-09

## 2023-02-09 VITALS — SYSTOLIC BLOOD PRESSURE: 144 MMHG | DIASTOLIC BLOOD PRESSURE: 98 MMHG | HEART RATE: 98 BPM

## 2023-02-09 VITALS — SYSTOLIC BLOOD PRESSURE: 144 MMHG | DIASTOLIC BLOOD PRESSURE: 82 MMHG

## 2023-02-09 DIAGNOSIS — I10 ESSENTIAL HYPERTENSION: Primary | ICD-10-CM

## 2023-02-09 DIAGNOSIS — Z01.30 BP CHECK: Primary | ICD-10-CM

## 2023-02-09 PROCEDURE — 99207 PR NO CHARGE NURSE ONLY: CPT | Performed by: NURSE PRACTITIONER

## 2023-02-09 PROCEDURE — 99207 PR NO CHARGE NURSE ONLY: CPT

## 2023-02-09 NOTE — TELEPHONE ENCOUNTER
Patient came into the clinic today because he was getting extremely high blood pressure readings with the new blood pressure machine he picked up and he wanted to make sure it was accurate.  Our reading here at the clinic was 144/98, patient took his blood pressure with his machine which read 166/102.  This was also checked previously in the pharmacy.  Patient states he will be returning this BP machine.    Patient would like to discuss being put on a alternative blood pressure medication after talking to family members.  Due to the water retention side effect and his strong family history of lymph edema he feels like Amlodipine may not be the best fit for him.    Please call to discuss.  Patient can be reached at [phone].

## 2023-02-09 NOTE — PROGRESS NOTES
Dontrell Billings was evaluated at [facility] on February 9, 2023 at which time his blood pressure was:    BP Readings from Last 1 Encounters:   02/09/23 (!) 144/82     No data recorded      Reviewed lifestyle modifications for blood pressure control and reduction: including making healthy food choices, managing weight, getting regular exercise, smoking cessation, reducing alcohol consumption, monitoring blood pressure regularly.     Symptoms: None    BP Goal:< 140/90 mmHg    BP Assessment:  BP too high    Potential Reasons for BP too high: Anxiety    BP Follow-Up Plan: Recheck BP in 30 days at pharmacy    Recommendation to Provider:     Note completed by: Jacob Davis RPH

## 2023-02-09 NOTE — PROGRESS NOTES
Dontrell Billings is a 57 year old patient who comes in today for a Blood Pressure check.  Initial BP:  BP (!) 144/98 (BP Location: Left arm, Patient Position: Sitting, Cuff Size: Adult Regular)   Pulse 98      Data Unavailable  Disposition: follow-up as previously indicated by provider and results routed to provider

## 2023-02-17 NOTE — TELEPHONE ENCOUNTER
Please follow up with patient regarding home blood pressures any side effects with Amlodipine   Thank you,  SW

## 2023-02-18 ENCOUNTER — HEALTH MAINTENANCE LETTER (OUTPATIENT)
Age: 58
End: 2023-02-18

## 2023-02-20 NOTE — TELEPHONE ENCOUNTER
Left message on patient's voicemail to call back and speak with a triage nurse.     JIM ShanksN RN  [facility]

## 2023-02-20 NOTE — TELEPHONE ENCOUNTER
Pt called back    He says his cousin was on amlodipine and gained 15 pounds of fluid from it.     Patient says his cousin is a doctor and he recommended losartan-hydrochlorothiazide instead of amlodipine    Since he was in for this BP check 2/9, he has not been taking the amlodipine. He does not want to take it.    Has not had any side effects but he doesn't want to experience this fluid retention that runs in his family.     His BP machine that he received as noted below was not reading correctly so he has not been taking his readings. He has to get a new one.    Wants to be on losartan-hydrochlorothiazide rather than amlodipine  BP Readings from Last 3 Encounters:   02/09/23 (!) 144/98   02/09/23 (!) 144/82   02/08/23 (!) 150/100     Noam-- patient has also not heard anything on his lab results, can you comment on these via result note? I did let him know cholesterol was elevated.    He is quite frustrated that nothing has been communicated to him regarding his visit nor the response from this encounter.    Vida Gavin, JIMN RN  [facility]

## 2023-03-01 DIAGNOSIS — E78.2 MIXED HYPERLIPIDEMIA: Primary | ICD-10-CM

## 2023-03-01 RX ORDER — LOSARTAN POTASSIUM AND HYDROCHLOROTHIAZIDE 12.5; 5 MG/1; MG/1
1 TABLET ORAL DAILY
Qty: 60 TABLET | Refills: 0 | Status: SHIPPED | OUTPATIENT
Start: 2023-03-01

## 2023-03-01 NOTE — TELEPHONE ENCOUNTER
Writer called patient and reviewed message per RASHMI Mckinney CNP.    Patient verbalized understanding and stated he will check with his cousins who are physicians which statin medication they recommend and then call triage back.    Awaiting patient's return call.    JIM ShettyN, RN-[facility]

## 2023-03-01 NOTE — RESULT ENCOUNTER NOTE
Dontrell,    --Your cholesterol levels (LDL-bad; triglycerides and total) are high. Your heart disease risk score is also above at 11.7% goal less than 5%. This is a measure of your risk for a heart attack or stroke over the next 10 years.     We recommend starting a cholesterol lowering medication called at statin at 10% to lower your risk of heart attack and stroke.      If you are interested in starting a statin medicine let me know and I will send it to your pharmacy.      Otherwise, please focus on exercise and heart healthy Mediterranean-style diet rich in fish, olive oil, whole grains, vegetables and low in animal fats and processed foods to reduce your risk of heart disease.    Recheck your cholesterol in 6 months. Please schedule a lab only appointment via [employer] or call [phone] for assistance.     The 10-year ASCVD risk score (Sarah TIMMONS, et al., 2019) is: 11.7%    Values used to calculate the score:      Age: 57 years      Sex: Male      Is Non- : No      Diabetic: No      Tobacco smoker: No      Systolic Blood Pressure: 144 mmHg      Is BP treated: Yes      HDL Cholesterol: 48 mg/dL      Total Cholesterol: 219 mg/dL      Sincerely  RAMIRO Gill CNP

## 2023-03-01 NOTE — TELEPHONE ENCOUNTER
-Result letter sent; recommend starting statin therapy ASCVD risk score 11.5%  Discontinue amlodipine; start losartan-hydrochlorothiazide 50/12.5mg daily    1. Check your blood pressure once a day 2-3 hours after medication  2.  you should be sitting restfully for ~10 minutes before you take it.  3. Note your blood pressure on a paper log or on your phone  4. In ~2 weeks, send me a message on [employer] with your results.  Your goal is <140/90, even better is <130/80.  5. Low sodium, high potassium (DASH) diet.  6. BMP (lab only) in 2 weeks    RAMIRO Gill CNP

## 2023-05-19 ENCOUNTER — OFFICE VISIT (OUTPATIENT)
Dept: DERMATOLOGY | Facility: CLINIC | Age: 58
End: 2023-05-19
Payer: COMMERCIAL

## 2023-05-19 DIAGNOSIS — B07.9 FILIFORM WART: ICD-10-CM

## 2023-05-19 DIAGNOSIS — L57.0 ACTINIC KERATOSIS: ICD-10-CM

## 2023-05-19 DIAGNOSIS — L82.0 INFLAMED SEBORRHEIC KERATOSIS: Primary | ICD-10-CM

## 2023-05-19 PROCEDURE — 17000 DESTRUCT PREMALG LESION: CPT | Mod: XS | Performed by: DERMATOLOGY

## 2023-05-19 PROCEDURE — 17110 DESTRUCTION B9 LES UP TO 14: CPT | Performed by: DERMATOLOGY

## 2023-05-19 PROCEDURE — 99213 OFFICE O/P EST LOW 20 MIN: CPT | Mod: 25 | Performed by: DERMATOLOGY

## 2023-05-19 ASSESSMENT — PAIN SCALES - GENERAL: PAINLEVEL: NO PAIN (0)

## 2023-05-19 NOTE — LETTER
5/19/2023       RE: Dontrell Billings  [address]  [place] [address]     Dear Colleague,    Thank you for referring your patient, Dontrell Billings, to the [facility] [place] at [facility]. Please see a copy of my visit note below.    [facility] Dermatology Note  Encounter Date: May 19, 2023  Office Visit     Dermatology Problem List:  1. Hx unknown skin cancer R lateral back ?2011- patient will request records  2. AK  - cryo 5/19/23  3. Inflamed SK vs. AK  - cryo 5/19/23  4. Filaform wart vs verrucous keratosis  - cryo 5/19/23  5. Seb dermatitis    ____________________________________________    Assessment & Plan:  # Filamentous lesion of right cheek, favor wart.  Discussed most likely wart and that it would be most reasonable to start with cryotherapy to prevent scarring of the face. Counseled that if lesion Is non-responsive to cryotherapy, then we would be concerned for potential precancer, would bring back in for shave biopsy.   - Cryotherapy performed today (see procedure note(s) below).  - Moisturize: Recommend good OTC moisturizer to full body, such as Cera-Ve, Yolande-cream, or Cetaphil. Advised best to apply after bathing to lock in moisture.  - Sun protection: Counseled SPF30+ sunscreen, UPF clothing, sun avoidance, tanning bed avoidance.     # Erythematous Macule with Scale, ddx: Seborrheic keratosis, inflamed, AK  On exam, lesion on right preauricular area looks most consistent with inflamed SK, however cannot definitively rule out AK. Discussed having him contact our clinic if lesion does not resolve with cryotherapy in a couple of weeks.  - Cryotherapy performed today (see procedure note(s) below).  - Moisturize: Recommend good OTC moisturizer to full body, such as Cera-Ve, Yolande-cream, or Cetaphil. Advised best to apply after bathing to lock in moisture.  - Monitor: Patient to continue monitoring at home  and will contact the clinic for any changes.  - Sun protection: Counseled SPF30+ sunscreen, UPF clothing, sun avoidance, tanning bed avoidance.     # Actinic keratosis. Right nose  - Cryotherapy performed today (see procedure note(s) below).  - Monitor: Patient to continue monitoring at home and will contact the clinic for any changes.  - Sun protection: Counseled SPF30+ sunscreen, UPF clothing, sun avoidance, tanning bed avoidance.     #Hx unknown skin cancer, right flank, no evidence of recurrence  Patient does not have records, dermatology practice he went to does not exist anymore.  - ABCDEs: Counseled ABCDEs of melanoma: Asymmetry, Border (irregularity), Color (not uniform, changes in color), Diameter (greater than 6 mm which is about the size of a pencil eraser), and Evolving (any changes in preexisting moles).  - Monitor: Patient to continue monitoring at home and will contact the clinic for any changes.  - Sun protection: Counseled SPF30+ sunscreen, UPF clothing, sun avoidance, tanning bed avoidance.    # Multiple benign lesions including SK, nevi, folliculitis, dermal nevi  Reassured that these lesions are benign.  - ABCDEs: Counseled ABCDEs of melanoma: Asymmetry, Border (irregularity), Color (not uniform, changes in color), Diameter (greater than 6 mm which is about the size of a pencil eraser), and Evolving (any changes in preexisting moles).  - Monitor: Patient to continue monitoring at home and will contact the clinic for any changes.  - Sun protection: Counseled SPF30+ sunscreen, UPF clothing, sun avoidance, tanning bed avoidance.    # Seborrheic dermatitis: Patient declines treatment.     Procedures Performed:   - Cryotherapy procedure note, location(s): right preauricular (1), right cheek (1), right nose (1). After verbal consent and discussion of risks and benefits including, but not limited to, dyspigmentation/scar, blister, and pain, 3 lesion(s) was(were) treated with 1-2 mm freeze border for 1-2  cycles with liquid nitrogen. Post cryotherapy instructions were provided.    Follow-up: 6 months or sooner for new or changing lesions.    Staff and Medical Student:    Seen and Staffed with Dr. Mansi Regan, MS4  [employer] [employer]    I was present with the medical student who participated in the service and in the documentation of the note.  I have verified the history and personally performed the physical exam and medical decision making.  I agree with the assessment and plan of care as documented in the note. I performed the procedure(s).     Mansi Johnson MD   of Dermatology  [facility]    ____________________________________________    CC: Skin Check (Dontrell is here for a skin check and wondering if he can get a mole removed on his right check that itches. )      HPI:  Mr. Dontrell Billings is a(n) 57 year old male who presents today as a return patient for FBSE    Had two spots pop up on bilateral cheeks back in February that looked identical to each other. He picked off the one on his left cheek, had one day of bleeding, has not experienced any recurrence of the spot/cannot see it on cheek now. Would like spot on his right cheek removed. Not painful or bloody. Appears to have had it treated by an NP prior to this appointment.     Also has an area in the right preauricular region that his daughter noticed a couple of weeks ago. Not itchy, painful, bleeding.    Has some dry spots on his arms that get irritated but go away with hydrocortisone.    Patient is otherwise feeling well, without additional concerns.    Labs:  None reviewed.    Physical Exam:  Vitals: There were no vitals taken for this visit.  SKIN: Total skin excluding the undergarment areas was performed. The exam included the head/face, neck, both arms, chest, back, abdomen, both legs, digits and/or nails.   - Multiple regular brown pigmented macules and papules are identified  on the trunk and extremities.   There are waxy stuck on tan to brown papules on the trunk and extremities.  There is a tan to brown waxy stuck on papule with surrounding erythema on the right preauricular skin.   There is xerosis of the trunk, extremities. .   - There is a hyperkeratotic filamentous papule on the right cheek  - There are skin colored fleshy papule(s) located on the upper back and neck  - There is macular erythema of the scalp, face with mild flaky white scale..   - There is an erythematous papule with overlying greasy scale on right nose  - No other lesions of concern on areas examined.     Medications:  Current Outpatient Medications   Medication    diclofenac (VOLTAREN) 1 % topical gel    gabapentin (NEURONTIN) 100 MG capsule    losartan-hydrochlorothiazide (HYZAAR) 50-12.5 MG tablet     No current facility-administered medications for this visit.      Past Medical/Surgical History:   Patient Active Problem List   Diagnosis    History of skin cancer    Dermal nevus    Cherry angioma    Achrochordon    Chronic left shoulder pain     No past medical history on file.    CC Referred Self, MD  No address on file on close of this encounter.

## 2023-05-19 NOTE — PATIENT INSTRUCTIONS
Thank you for coming in today!    We froze 3 spots on you today. These will become red and irritated in the next couple of days and will fall off in the next couple of weeks. Apply vaseline to these areas. If the spot on your cheek doesn't fall off in the next 2 weeks, please send Dr. Johnson a [employer] message so that we can get you seen sooner.

## 2023-05-19 NOTE — NURSING NOTE
Dermatology Rooming Note    Dontrell Billings's goals for this visit include:   Chief Complaint   Patient presents with     Skin Check     Dontrell is here for a skin check and wondering if he can get a mole removed on his right check that itches.      Loraine Moon, visit facilitator

## 2023-05-19 NOTE — PROGRESS NOTES
[facility] Dermatology Note  Encounter Date: May 19, 2023  Office Visit     Dermatology Problem List:  1. Hx unknown skin cancer R lateral back ?2011- patient will request records  2. AK  - cryo 5/19/23  3. Inflamed SK vs. AK  - cryo 5/19/23  4. Filaform wart vs verrucous keratosis  - cryo 5/19/23  5. Seb dermatitis    ____________________________________________    Assessment & Plan:  # Filamentous lesion of right cheek, favor wart.  Discussed most likely wart and that it would be most reasonable to start with cryotherapy to prevent scarring of the face. Counseled that if lesion Is non-responsive to cryotherapy, then we would be concerned for potential precancer, would bring back in for shave biopsy.   - Cryotherapy performed today (see procedure note(s) below).  - Moisturize: Recommend good OTC moisturizer to full body, such as Cera-Ve, Yolande-cream, or Cetaphil. Advised best to apply after bathing to lock in moisture.  - Sun protection: Counseled SPF30+ sunscreen, UPF clothing, sun avoidance, tanning bed avoidance.     # Erythematous Macule with Scale, ddx: Seborrheic keratosis, inflamed, AK  On exam, lesion on right preauricular area looks most consistent with inflamed SK, however cannot definitively rule out AK. Discussed having him contact our clinic if lesion does not resolve with cryotherapy in a couple of weeks.  - Cryotherapy performed today (see procedure note(s) below).  - Moisturize: Recommend good OTC moisturizer to full body, such as Cera-Ve, Yolande-cream, or Cetaphil. Advised best to apply after bathing to lock in moisture.  - Monitor: Patient to continue monitoring at home and will contact the clinic for any changes.  - Sun protection: Counseled SPF30+ sunscreen, UPF clothing, sun avoidance, tanning bed avoidance.     # Actinic keratosis. Right nose  - Cryotherapy performed today (see procedure note(s) below).  - Monitor: Patient to continue monitoring at home and will contact the  clinic for any changes.  - Sun protection: Counseled SPF30+ sunscreen, UPF clothing, sun avoidance, tanning bed avoidance.     #Hx unknown skin cancer, right flank, no evidence of recurrence  Patient does not have records, dermatology practice he went to does not exist anymore.  - ABCDEs: Counseled ABCDEs of melanoma: Asymmetry, Border (irregularity), Color (not uniform, changes in color), Diameter (greater than 6 mm which is about the size of a pencil eraser), and Evolving (any changes in preexisting moles).  - Monitor: Patient to continue monitoring at home and will contact the clinic for any changes.  - Sun protection: Counseled SPF30+ sunscreen, UPF clothing, sun avoidance, tanning bed avoidance.    # Multiple benign lesions including SK, nevi, folliculitis, dermal nevi  Reassured that these lesions are benign.  - ABCDEs: Counseled ABCDEs of melanoma: Asymmetry, Border (irregularity), Color (not uniform, changes in color), Diameter (greater than 6 mm which is about the size of a pencil eraser), and Evolving (any changes in preexisting moles).  - Monitor: Patient to continue monitoring at home and will contact the clinic for any changes.  - Sun protection: Counseled SPF30+ sunscreen, UPF clothing, sun avoidance, tanning bed avoidance.    # Seborrheic dermatitis: Patient declines treatment.     Procedures Performed:   - Cryotherapy procedure note, location(s): right preauricular (1), right cheek (1), right nose (1). After verbal consent and discussion of risks and benefits including, but not limited to, dyspigmentation/scar, blister, and pain, 3 lesion(s) was(were) treated with 1-2 mm freeze border for 1-2 cycles with liquid nitrogen. Post cryotherapy instructions were provided.    Follow-up: 6 months or sooner for new or changing lesions.    Staff and Medical Student:    Seen and Staffed with Dr. Mansi Regan, MS4  [employer]    I was present with the medical student  who participated in the service and in the documentation of the note.  I have verified the history and personally performed the physical exam and medical decision making.  I agree with the assessment and plan of care as documented in the note. I performed the procedure(s).     Mansi Johnson MD   of Dermatology  [facility]    ____________________________________________    CC: Skin Check (Dontrell is here for a skin check and wondering if he can get a mole removed on his right check that itches. )      HPI:  Mr. Dontrell Billings is a(n) 57 year old male who presents today as a return patient for FBSE    Had two spots pop up on bilateral cheeks back in February that looked identical to each other. He picked off the one on his left cheek, had one day of bleeding, has not experienced any recurrence of the spot/cannot see it on cheek now. Would like spot on his right cheek removed. Not painful or bloody. Appears to have had it treated by an NP prior to this appointment.     Also has an area in the right preauricular region that his daughter noticed a couple of weeks ago. Not itchy, painful, bleeding.    Has some dry spots on his arms that get irritated but go away with hydrocortisone.    Patient is otherwise feeling well, without additional concerns.    Labs:  None reviewed.    Physical Exam:  Vitals: There were no vitals taken for this visit.  SKIN: Total skin excluding the undergarment areas was performed. The exam included the head/face, neck, both arms, chest, back, abdomen, both legs, digits and/or nails.   - Multiple regular brown pigmented macules and papules are identified on the trunk and extremities.   There are waxy stuck on tan to brown papules on the trunk and extremities.  There is a tan to brown waxy stuck on papule with surrounding erythema on the right preauricular skin.   There is xerosis of the trunk, extremities. .   - There is a hyperkeratotic filamentous papule on the  right cheek  - There are skin colored fleshy papule(s) located on the upper back and neck  - There is macular erythema of the scalp, face with mild flaky white scale..   - There is an erythematous papule with overlying greasy scale on right nose  - No other lesions of concern on areas examined.     Medications:  Current Outpatient Medications   Medication     diclofenac (VOLTAREN) 1 % topical gel     gabapentin (NEURONTIN) 100 MG capsule     losartan-hydrochlorothiazide (HYZAAR) 50-12.5 MG tablet     No current facility-administered medications for this visit.      Past Medical/Surgical History:   Patient Active Problem List   Diagnosis     History of skin cancer     Dermal nevus     Cherry angioma     Achrochordon     Chronic left shoulder pain     No past medical history on file.    CC Referred Self, MD  No address on file on close of this encounter.

## 2023-06-11 PROBLEM — I10 ESSENTIAL HYPERTENSION: Status: ACTIVE | Noted: 2017-01-16

## 2023-06-11 PROBLEM — R94.31 ABNORMAL ECG: Status: ACTIVE | Noted: 2017-01-16

## 2023-06-11 PROBLEM — F40.243 FEAR OF FLYING: Status: ACTIVE | Noted: 2019-12-04

## 2023-06-11 PROBLEM — R94.31 ABNORMAL ECG: Status: RESOLVED | Noted: 2017-01-16 | Resolved: 2023-06-11

## 2023-06-11 PROBLEM — R07.9 CHEST PAIN, UNSPECIFIED TYPE: Status: ACTIVE | Noted: 2017-01-16

## 2023-06-11 PROBLEM — R07.9 CHEST PAIN, UNSPECIFIED TYPE: Status: RESOLVED | Noted: 2017-01-16 | Resolved: 2023-06-11

## 2023-06-11 PROBLEM — I10 ESSENTIAL HYPERTENSION: Chronic | Status: ACTIVE | Noted: 2017-01-16

## 2024-03-16 ENCOUNTER — HEALTH MAINTENANCE LETTER (OUTPATIENT)
Age: 59
End: 2024-03-16

## 2025-03-22 ENCOUNTER — HEALTH MAINTENANCE LETTER (OUTPATIENT)
Age: 60
End: 2025-03-22